# Patient Record
Sex: FEMALE | Race: WHITE | NOT HISPANIC OR LATINO | Employment: FULL TIME | URBAN - METROPOLITAN AREA
[De-identification: names, ages, dates, MRNs, and addresses within clinical notes are randomized per-mention and may not be internally consistent; named-entity substitution may affect disease eponyms.]

---

## 2019-03-18 ENCOUNTER — HOSPITAL ENCOUNTER (OUTPATIENT)
Dept: OTHER | Facility: HOSPITAL | Age: 54
Discharge: HOME | End: 2019-03-18

## 2020-10-01 ENCOUNTER — HOSPITAL ENCOUNTER (OUTPATIENT)
Dept: OTHER | Facility: HOSPITAL | Age: 55
Discharge: HOME | End: 2020-10-01

## 2021-04-27 ENCOUNTER — OFFICE VISIT CONVERTED (OUTPATIENT)
Dept: FAMILY MEDICINE CLINIC | Age: 56
End: 2021-04-27
Attending: FAMILY MEDICINE

## 2021-05-18 NOTE — PROGRESS NOTES
Ada Kilgore  08/31/1955     Office/Outpatient Visit    Visit Date: Tue, Apr 27, 2021 02:39 pm    Provider: Aric Joya MD (Assistant: Bertha Jasso MA)    Location: [facility]        Electronically signed by Aric Joya MD on  04/27/2021 03:22:20 PM                             Subjective:        CC: Ms. Kilgore is a 65 year old female.  This is her first visit to the clinic.  Recent CHF so she needs to establish care with a primary provider.;         HPI:           PHQ-9 Depression Screening: Completed form scanned and in chart; Total Score 9           In regard to the hypothyroidism, unspecified, she is currently taking Levothyroid, 50 mcg daily.  She cannot remember when a TSH was last checked.  She does not know the results of that test.  She denies any related symptoms.  She reports no symptoms suggestive of adverse medication effect.            Additionally, she presents with history of type 2 diabetes mellitus without complications.  specifically, this is type 2, non-insulin requiring diabetes, complicated by coronary artery disease.  Compliance with treatment has been good; she takes her medication as directed, follows up as directed, and is keeping a glucose diary.  She denies experiencing any diabetes related symptoms.  Current meds include an oral hypoglycemic ( Farxiga ( 10 mg QD ) and Glucophage ).  She reports home blood glucose readings have been high, with average fasting glucoses in the 180 to low 200s mg/dL range.  She does not know results of any recent lab monitoring.  Concurrent health problems include hypertension, hypercholesterolemia, and hypothyroidism.            Dx with hyperlipidemia, unspecified; current treatment includes Crestor.  Compliance with treatment has been good; she takes her medication as directed and follows up as directed.  She denies experiencing any hypercholesterolemia related symptoms.  She does not know results of any recent lab monitoring.   Concurrent health problems include hypertension, diabetes, and hypothyroidism.            Additionally, she presents with history of essential (primary) hypertension.  her current cardiac medication regimen includes a diuretic ( Lasix 40 mg daily, spironolactone 25 mg daily ) and a beta-blocker ( Carvedilol 3.125 mg twice daily ).  Compliance with treatment has been good; she takes her medication as directed and follows up as directed.  She is tolerating the medication well without side effects.  Review of her blood pressure log reveals systolics in the 120s and diastolics in the 70s to 80s.         Pertaining to CHF, Ada reports that she was diagnosed approximately 6 to 8 weeks ago at which time she was hospitalized to [place].  She follows with cardiology regularly.  Today, she denies symptoms.  Specifically, she denies chest pain, shortness of breath, edema or palpitations.  Her current regimen includes digoxin, Lasix, spironolactone and carvedilol.  She is not sure what type of congestive heart failure she has.  She says that during her hospitalization she had an echocardiogram and a cardiac catheterization done.  To her knowledge, the cardiac cath was clean of CAD.    ROS:     CONSTITUTIONAL:  Negative for chills, fatigue, fever, and weight change.      EYES:  Negative for blurred vision.      E/N/T:  Negative for ear pain and tinnitus.      CARDIOVASCULAR:  Negative for chest pain, dizziness, palpitations and edema.      RESPIRATORY:  Negative for dyspnea and cough.      GASTROINTESTINAL:  Negative for abdominal pain, diarrhea, heartburn, nausea and vomiting.      GENITOURINARY:  Negative for dysuria, hematuria and polyuria.      MUSCULOSKELETAL:  Negative for arthralgias and myalgias.      INTEGUMENTARY/BREAST:  Negative for rash, breast mass and skin changes of breast.      NEUROLOGICAL:  Negative for headaches, paresthesias and weakness.      ENDOCRINE:  Negative for hair loss, heat/cold intolerance,  polydipsia, and polyphagia.      PSYCHIATRIC:  Negative for anxiety, depression, and sleep disturbances.          Past Medical History / Family History / Social History:         Last Reviewed on 2021 03:22 PM by Aric Joya    Past Medical History:             PAST MEDICAL HISTORY         Positive for    Congestive Heart Failure,    Hyperlipidemia and    Hypertension;     Positive for    Type 2 Diabetes and    Hypothyroidism;         PREVENTIVE HEALTH MAINTENANCE             COLORECTAL CANCER SCREENING: declines colorectal cancer screening, understands reason for testing     PAP SMEAR: with normal results         Surgical History:         Positive for    : X 2; and    Tonsillectomy/Adenoidectomy;         Family History:         Positive for Congestive Heart Failure, Coronary Artery Disease, Hyperlipidemia, Hypertension and Myocardial Infarction;     Positive for Type 2 Diabetes;         Social History:     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          Substance Abuse History:     Last Reviewed on 2021 03:22 PM by Aric Joya        Mental Health History:     Last Reviewed on 2021 03:22 PM by Aric Joya        Communicable Diseases (eg STDs):     Last Reviewed on 2021 03:22 PM by Aric Joya        Current Problems:     Last Reviewed on 2021 03:22 PM by Aric Joya    Hypothyroidism, unspecified    Type 2 diabetes mellitus without complications    Hyperlipidemia, unspecified    Essential (primary) hypertension    Heart failure, unspecified    Encounter for screening for depression        Immunizations:     None        Current Medications:     Last Reviewed on 2021 03:22 PM by Aric Joya    iron 325 mg (65 mg iron) oral tablet [take 1 tablet (65 mg) by oral  "route 2 times per day]    zinc 50 mg oral tablet    flaxseed  [1000mg]    Vitamin C  [1000mg]    CARVEDILOL   TAB 3.125MG    DIGOXIN      TAB 0.125MG    FARXIGA      TAB 10MG    FUROSEMIDE   TAB 40MG    ROSUVASTATIN TAB 10MG    SPIRONOLACT  TAB 25MG    METFORMIN    TAB 500MG ER    LEVOTHYROXIN TAB 50MCG        Objective:        Vitals:         Current: 4/27/2021 2:50:02 PM    Ht:  5 ft, 1 in;  Wt: 183.2 lbs;  BMI: 34.6T: 96.3 F (temporal);  BP: 129/74 mm Hg (right arm, sitting);  P: 94 bpm (right arm (BP Cuff), sitting)        Exams:     PHYSICAL EXAM:     GENERAL: vital signs recorded - well developed, well nourished;  no apparent distress;     EYES: conjunctiva and cornea are normal;     RESPIRATORY: no rales (\"crackles\") present; no rhonchi; no wheezes; Clear to auscultation bilaterally;     CARDIOVASCULAR: normal rate; rhythm is regular;  a systolic murmur is noted: it is grade 2/6;  no edema;     GASTROINTESTINAL: nontender; Soft and nondistended; normal bowel sounds; no organomegaly; no masses;     LYMPHATIC: no enlargement of cervical or facial nodes; no supraclavicular nodes;     BREAST/INTEGUMENT: No significant rashes, lesions or suspicious moles within limits of examination;     NEUROLOGIC: Grossly intact; mental status: alert and oriented x 3;     PSYCHIATRIC: appropriate affect and demeanor; normal speech pattern; Normal behavior;         Assessment:         E03.9   Hypothyroidism, unspecified       E11.9   Type 2 diabetes mellitus without complications       E78.5   Hyperlipidemia, unspecified       I10   Essential (primary) hypertension       I50.9   Heart failure, unspecified       Z13.31   Encounter for screening for depression           ORDERS:         Other Orders:         Depression screen negative  (In-House)                      Plan:         Hypothyroidism, unspecified- Unknown control.  Continue Synthroid 50 mcg daily.  Will request records from previous provider.  Will recheck TSH at " next appointment.        Type 2 diabetes mellitus without complications- Unknown control.  Continue Metformin and Farxiga.  Will request records from previous provider.  Will repeat A1c at next visit.        Hyperlipidemia, unspecifiedUnknown control.  Continue Crestor 10 mg daily.  Will request records from previous provider.  Will repeat check lipid panel at next appointment.        Essential (primary) hypertensionStable.  Continue Lasix 40 mg daily, spironolactone 25 mg daily and carvedilol 3.125 mg daily.        Heart failure, unspecifiedStable.  Continue to follow with cardiology as directed.  Continue current regimen. Records from cardiologist as well as hospitalization.        Encounter for screening for depression    MIPS PHQ-9 Depression Screening: Completed form scanned and in chart; Total Score 9; Positive Depression Screen but after further evaluation the patient does not have a diagnosis of depression.            Orders:         Depression screen negative  (In-House)                  Charge Capture:         Primary Diagnosis:     E03.9  Hypothyroidism, unspecified           Orders:      05297  Office visit - new pt, level 3  (In-House)              E11.9  Type 2 diabetes mellitus without complications     E78.5  Hyperlipidemia, unspecified     I10  Essential (primary) hypertension     I50.9  Heart failure, unspecified     Z13.31  Encounter for screening for depression           Orders:        Depression screen negative  (In-House)

## 2021-07-02 VITALS
SYSTOLIC BLOOD PRESSURE: 129 MMHG | DIASTOLIC BLOOD PRESSURE: 74 MMHG | HEIGHT: 61 IN | HEART RATE: 94 BPM | TEMPERATURE: 96.3 F | BODY MASS INDEX: 34.59 KG/M2 | WEIGHT: 183.2 LBS

## 2021-07-29 ENCOUNTER — OFFICE VISIT (OUTPATIENT)
Dept: FAMILY MEDICINE CLINIC | Age: 56
End: 2021-07-29

## 2021-07-29 VITALS
BODY MASS INDEX: 34.02 KG/M2 | DIASTOLIC BLOOD PRESSURE: 57 MMHG | WEIGHT: 180.2 LBS | SYSTOLIC BLOOD PRESSURE: 112 MMHG | HEIGHT: 61 IN | HEART RATE: 73 BPM

## 2021-07-29 DIAGNOSIS — I50.22 CHRONIC SYSTOLIC CONGESTIVE HEART FAILURE (HCC): ICD-10-CM

## 2021-07-29 DIAGNOSIS — I10 ESSENTIAL HYPERTENSION: Primary | ICD-10-CM

## 2021-07-29 DIAGNOSIS — E11.9 TYPE 2 DIABETES MELLITUS WITHOUT COMPLICATION, WITHOUT LONG-TERM CURRENT USE OF INSULIN (HCC): ICD-10-CM

## 2021-07-29 DIAGNOSIS — E03.9 HYPOTHYROIDISM, UNSPECIFIED TYPE: ICD-10-CM

## 2021-07-29 DIAGNOSIS — E78.5 HYPERLIPIDEMIA, UNSPECIFIED HYPERLIPIDEMIA TYPE: ICD-10-CM

## 2021-07-29 PROBLEM — I50.9 CHF (CONGESTIVE HEART FAILURE) (HCC): Status: ACTIVE | Noted: 2021-07-29

## 2021-07-29 PROCEDURE — 99214 OFFICE O/P EST MOD 30 MIN: CPT | Performed by: FAMILY MEDICINE

## 2021-07-29 RX ORDER — CARVEDILOL 25 MG/1
25 TABLET ORAL 2 TIMES DAILY
COMMUNITY
Start: 2021-06-28

## 2021-07-29 RX ORDER — LOSARTAN POTASSIUM 25 MG/1
12.5 TABLET ORAL DAILY
COMMUNITY
Start: 2021-05-13

## 2021-07-29 RX ORDER — FUROSEMIDE 40 MG/1
40 TABLET ORAL DAILY
COMMUNITY
Start: 2021-04-12

## 2021-07-29 RX ORDER — METFORMIN HYDROCHLORIDE 500 MG/1
1 TABLET, EXTENDED RELEASE ORAL 2 TIMES DAILY
COMMUNITY
Start: 2021-06-10

## 2021-07-29 RX ORDER — ALBUTEROL SULFATE 2.5 MG/3ML
1 SOLUTION RESPIRATORY (INHALATION)
COMMUNITY
Start: 2021-03-06

## 2021-07-29 RX ORDER — DIGOXIN 125 MCG
1 TABLET ORAL DAILY
COMMUNITY
Start: 2021-05-12

## 2021-07-29 RX ORDER — LEVOTHYROXINE SODIUM 0.05 MG/1
50 TABLET ORAL DAILY
Qty: 90 TABLET | Refills: 1 | Status: SHIPPED | OUTPATIENT
Start: 2021-07-29

## 2021-07-29 RX ORDER — LEVOTHYROXINE SODIUM 0.05 MG/1
50 TABLET ORAL DAILY
COMMUNITY
End: 2021-07-29 | Stop reason: SDUPTHER

## 2021-07-29 RX ORDER — ASPIRIN 81 MG/1
81 TABLET ORAL DAILY
COMMUNITY
Start: 2021-03-16

## 2021-07-29 RX ORDER — SPIRONOLACTONE 25 MG/1
0.5 TABLET ORAL DAILY
COMMUNITY
Start: 2021-07-09

## 2021-07-29 RX ORDER — ROSUVASTATIN CALCIUM 10 MG/1
TABLET, COATED ORAL
COMMUNITY
Start: 2021-07-09

## 2021-07-29 RX ORDER — GLIMEPIRIDE 2 MG/1
1 TABLET ORAL DAILY
COMMUNITY
Start: 2021-03-25

## 2021-07-29 NOTE — PROGRESS NOTES
[facility]  Office Visit Note    Ada Kilgore presents to [facility]  Encounter Date: 07/29/2021     Chief Complaint  Hypothyroidism (Follow up), Diabetes (Follow up), and Hypertension (Follow up)    Subjective    History of Present Illness:      1.  Hypertension: Pertaining to hypertension, Ada reports that her blood pressures have been well controlled on her current regimen of Lasix 40 mg daily, spironolactone 25 mg daily, losartan 12.5 mg daily and carvedilol 25 mg twice daily.  2.  DM2: Pertaining to type 2 diabetes, Ada reports that she does not check her blood sugar very regularly.  Her current regimen includes Metformin 500 mg twice daily, Farxiga 10 mg daily.  Her most recent A1c was 7.8%.  3.  Hypothyroidism: Pertaining to hypothyroidism, she can then takes Synthroid 50 mcg daily.  In regard to the hypothyroidism, unspecified, she is currently taking Levothyroid, 50 mcg daily. She cannot remember when a TSH was last checked. She denies any related symptoms.    4.  Hyperlipidemia: Pertaining hyperlipidemia, Ada takes Crestor 10 mg daily. Her most recent lipid panel showed a total cholesterol of 176 and an LDL of 79 on 3/15/2021.  5.  CHF: Pertaining to heart failure, this is a relatively new diagnosis for Ada within the last year.  She has been diagnosed with nonischemic cardiomyopathy with systolic heart failure.  Cardiac catheterization was clean.  She follows with cardiology regularly.  Today, she denies symptoms.  Her current regimen includes digoxin 125 mcg daily, Lasix 40 mg daily, spironolactone 25 mg daily and carvedilol 25 mg twice daily.  She has a new echocardiogram upcoming.          Review of Systems:  Review of Systems   Constitutional: Negative for chills, fatigue, fever and unexpected weight loss.   Eyes: Negative for blurred vision.   Respiratory: Negative for cough and shortness of breath.    Cardiovascular: Negative for  "chest pain, palpitations and leg swelling.   Gastrointestinal: Negative for abdominal pain, constipation, diarrhea, nausea and vomiting.   Endocrine: Negative for cold intolerance, heat intolerance, polydipsia, polyphagia and polyuria.   Neurological: Negative for dizziness, weakness, numbness and headache.   Psychiatric/Behavioral: Negative for sleep disturbance, suicidal ideas and depressed mood. The patient is not nervous/anxious.         Objective   Vital Signs:  /57 (BP Location: Right arm, Patient Position: Sitting, Cuff Size: Adult)   Pulse 73   Ht 154.9 cm (61\")   Wt 81.7 kg (180 lb 3.2 oz)   BMI 34.05 kg/m²      Physical Examination:  Physical Exam  Vitals reviewed.   Constitutional:       General: She is not in acute distress.     Appearance: Normal appearance.   HENT:      Head: Normocephalic and atraumatic.   Eyes:      Conjunctiva/sclera: Conjunctivae normal.   Cardiovascular:      Rate and Rhythm: Normal rate and regular rhythm.      Heart sounds: No murmur heard.     Pulmonary:      Effort: Pulmonary effort is normal. No respiratory distress.      Breath sounds: Normal breath sounds.   Musculoskeletal:      Right lower leg: No edema.      Left lower leg: No edema.   Skin:     General: Skin is warm and dry.      Findings: No rash.   Neurological:      General: No focal deficit present.      Mental Status: She is alert and oriented to person, place, and time.   Psychiatric:         Mood and Affect: Mood normal.         Behavior: Behavior normal.         Result Review   The following data was reviewed by: Aric Joya MD on 07/29/2021:  LIPID PANEL (03/15/2021 03:32)  HEMOGLOBIN A1C (03/12/2021 04:22)  BASIC METABOLIC PANEL (06/25/2021 14:15)  HEPATIC FUNCTION PANEL (03/13/2021 05:53)    Procedures:    No procedures associated with this visit.       Assessment and Plan:  Diagnoses and all orders for this visit:    1. Essential hypertension (Primary)  Assessment & Plan:  Stable.  Continue Lasix " 40 mg daily, spironolactone 25 mg daily, losartan 12.5 mg daily and carvedilol 25 mg twice daily.    Orders:  -     CBC w AUTO Differential; Future  -     Comprehensive metabolic panel; Future  -     Lipid panel; Future  -     TSH; Future    2. Type 2 diabetes mellitus without complication, without long-term current use of insulin (CMS/[facility])  Assessment & Plan:  Not at goal.  Continue Metformin 1000 mg twice daily and Farxiga 10 mg daily.  A1c ordered today.    Orders:  -     Hemoglobin A1c; Future    3. Hypothyroidism, unspecified type  Assessment & Plan:  Stable.  Continue Synthroid 50 mcg daily.      4. Hyperlipidemia, unspecified hyperlipidemia type  Assessment & Plan:  Stable.  Continue Crestor 10 mg daily.      5. Chronic systolic congestive heart failure (CMS/[facility])  Assessment & Plan:  Stable.  Continue to follow cardiology as directed.  Continue digoxin 125 microbes daily, Lasix 40 mg daily, spironolactone 25 mg daily and carvedilol 25 mg twice daily.      Other orders  -     levothyroxine (SYNTHROID, LEVOTHROID) 50 MCG tablet; Take 1 tablet by mouth Daily.  Dispense: 90 tablet; Refill: 1      Return in about 3 months (around 10/29/2021).    Patient was given instructions and counseling regarding her condition or for health maintenance advice. Please see specific information pulled into the AVS if appropriate.      Aric Joya MD   7/29/2021

## 2021-07-29 NOTE — ASSESSMENT & PLAN NOTE
Stable.  Continue Lasix 40 mg daily, spironolactone 25 mg daily, losartan 12.5 mg daily and carvedilol 25 mg twice daily.

## 2021-07-29 NOTE — ASSESSMENT & PLAN NOTE
Stable.  Continue to follow cardiology as directed.  Continue digoxin 125 microbes daily, Lasix 40 mg daily, spironolactone 25 mg daily and carvedilol 25 mg twice daily.

## 2021-08-05 ENCOUNTER — LAB (OUTPATIENT)
Dept: LAB | Facility: HOSPITAL | Age: 56
End: 2021-08-05

## 2021-08-05 DIAGNOSIS — I10 ESSENTIAL HYPERTENSION: ICD-10-CM

## 2021-08-05 DIAGNOSIS — E11.9 TYPE 2 DIABETES MELLITUS WITHOUT COMPLICATION, WITHOUT LONG-TERM CURRENT USE OF INSULIN (HCC): ICD-10-CM

## 2021-08-05 LAB
BASOPHILS # BLD AUTO: 0.03 10*3/MM3 (ref 0–0.2)
BASOPHILS NFR BLD AUTO: 0.4 % (ref 0–1.5)
DEPRECATED RDW RBC AUTO: 47.6 FL (ref 37–54)
EOSINOPHIL # BLD AUTO: 0.22 10*3/MM3 (ref 0–0.4)
EOSINOPHIL NFR BLD AUTO: 2.6 % (ref 0.3–6.2)
ERYTHROCYTE [DISTWIDTH] IN BLOOD BY AUTOMATED COUNT: 14.2 % (ref 12.3–15.4)
HBA1C MFR BLD: 9.05 % (ref 4.8–5.6)
HCT VFR BLD AUTO: 41.5 % (ref 34–46.6)
HGB BLD-MCNC: 13.4 G/DL (ref 12–15.9)
HOLD SPECIMEN: NORMAL
IMM GRANULOCYTES # BLD AUTO: 0.03 10*3/MM3 (ref 0–0.05)
IMM GRANULOCYTES NFR BLD AUTO: 0.4 % (ref 0–0.5)
LYMPHOCYTES # BLD AUTO: 2.5 10*3/MM3 (ref 0.7–3.1)
LYMPHOCYTES NFR BLD AUTO: 29.9 % (ref 19.6–45.3)
MCH RBC QN AUTO: 29.3 PG (ref 26.6–33)
MCHC RBC AUTO-ENTMCNC: 32.3 G/DL (ref 31.5–35.7)
MCV RBC AUTO: 90.6 FL (ref 79–97)
MONOCYTES # BLD AUTO: 0.52 10*3/MM3 (ref 0.1–0.9)
MONOCYTES NFR BLD AUTO: 6.2 % (ref 5–12)
NEUTROPHILS NFR BLD AUTO: 5.07 10*3/MM3 (ref 1.7–7)
NEUTROPHILS NFR BLD AUTO: 60.5 % (ref 42.7–76)
PLATELET # BLD AUTO: 137 10*3/MM3 (ref 140–450)
PMV BLD AUTO: 11.8 FL (ref 6–12)
RBC # BLD AUTO: 4.58 10*6/MM3 (ref 3.77–5.28)
TSH SERPL DL<=0.05 MIU/L-ACNC: 3.6 UIU/ML (ref 0.27–4.2)
WBC # BLD AUTO: 8.37 10*3/MM3 (ref 3.4–10.8)

## 2021-08-05 PROCEDURE — 80053 COMPREHEN METABOLIC PANEL: CPT

## 2021-08-05 PROCEDURE — 84443 ASSAY THYROID STIM HORMONE: CPT

## 2021-08-05 PROCEDURE — 36415 COLL VENOUS BLD VENIPUNCTURE: CPT

## 2021-08-05 PROCEDURE — 80061 LIPID PANEL: CPT

## 2021-08-05 PROCEDURE — 83036 HEMOGLOBIN GLYCOSYLATED A1C: CPT

## 2021-08-05 PROCEDURE — 85025 COMPLETE CBC W/AUTO DIFF WBC: CPT

## 2021-08-06 DIAGNOSIS — D69.59 THROMBOCYTOPENIA DUE TO COVID-19 VIRUS: Primary | ICD-10-CM

## 2021-08-06 DIAGNOSIS — U07.1 THROMBOCYTOPENIA DUE TO COVID-19 VIRUS: Primary | ICD-10-CM

## 2021-08-06 LAB
ALBUMIN SERPL-MCNC: 4 G/DL (ref 3.5–5.2)
ALBUMIN/GLOB SERPL: 1.4 G/DL
ALP SERPL-CCNC: 71 U/L (ref 39–117)
ALT SERPL W P-5'-P-CCNC: 22 U/L (ref 1–33)
ANION GAP SERPL CALCULATED.3IONS-SCNC: 9.2 MMOL/L (ref 5–15)
AST SERPL-CCNC: 20 U/L (ref 1–32)
BILIRUB SERPL-MCNC: 0.6 MG/DL (ref 0–1.2)
BUN SERPL-MCNC: 11 MG/DL (ref 6–20)
BUN/CREAT SERPL: 20.4 (ref 7–25)
CALCIUM SPEC-SCNC: 9.3 MG/DL (ref 8.6–10.5)
CHLORIDE SERPL-SCNC: 97 MMOL/L (ref 98–107)
CHOLEST SERPL-MCNC: 113 MG/DL (ref 0–200)
CO2 SERPL-SCNC: 30.8 MMOL/L (ref 22–29)
CREAT SERPL-MCNC: 0.54 MG/DL (ref 0.57–1)
GFR SERPL CREATININE-BSD FRML MDRD: 117 ML/MIN/1.73
GFR SERPL CREATININE-BSD FRML MDRD: 142 ML/MIN/1.73
GLOBULIN UR ELPH-MCNC: 2.8 GM/DL
GLUCOSE SERPL-MCNC: 193 MG/DL (ref 65–99)
HDLC SERPL-MCNC: 25 MG/DL (ref 40–60)
LDLC SERPL CALC-MCNC: 49 MG/DL (ref 0–100)
LDLC/HDLC SERPL: 1.57 {RATIO}
POTASSIUM SERPL-SCNC: 4.3 MMOL/L (ref 3.5–5.2)
PROT SERPL-MCNC: 6.8 G/DL (ref 6–8.5)
SODIUM SERPL-SCNC: 137 MMOL/L (ref 136–145)
TRIGL SERPL-MCNC: 244 MG/DL (ref 0–150)
VLDLC SERPL-MCNC: 39 MG/DL (ref 5–40)

## 2021-08-17 ENCOUNTER — LAB (OUTPATIENT)
Dept: LAB | Facility: HOSPITAL | Age: 56
End: 2021-08-17

## 2021-08-17 DIAGNOSIS — D69.59 THROMBOCYTOPENIA DUE TO COVID-19 VIRUS: ICD-10-CM

## 2021-08-17 DIAGNOSIS — U07.1 THROMBOCYTOPENIA DUE TO COVID-19 VIRUS: ICD-10-CM

## 2021-08-17 LAB
BASOPHILS # BLD AUTO: 0.09 10*3/MM3 (ref 0–0.2)
BASOPHILS NFR BLD AUTO: 0.9 % (ref 0–1.5)
DEPRECATED RDW RBC AUTO: 44.7 FL (ref 37–54)
EOSINOPHIL # BLD AUTO: 0.27 10*3/MM3 (ref 0–0.4)
EOSINOPHIL NFR BLD AUTO: 2.6 % (ref 0.3–6.2)
ERYTHROCYTE [DISTWIDTH] IN BLOOD BY AUTOMATED COUNT: 13.6 % (ref 12.3–15.4)
HCT VFR BLD AUTO: 43.3 % (ref 34–46.6)
HGB BLD-MCNC: 13.7 G/DL (ref 12–15.9)
IMM GRANULOCYTES # BLD AUTO: 0.03 10*3/MM3 (ref 0–0.05)
IMM GRANULOCYTES NFR BLD AUTO: 0.3 % (ref 0–0.5)
LYMPHOCYTES # BLD AUTO: 3.07 10*3/MM3 (ref 0.7–3.1)
LYMPHOCYTES NFR BLD AUTO: 29.5 % (ref 19.6–45.3)
MCH RBC QN AUTO: 28.5 PG (ref 26.6–33)
MCHC RBC AUTO-ENTMCNC: 31.6 G/DL (ref 31.5–35.7)
MCV RBC AUTO: 90.2 FL (ref 79–97)
MONOCYTES # BLD AUTO: 0.63 10*3/MM3 (ref 0.1–0.9)
MONOCYTES NFR BLD AUTO: 6 % (ref 5–12)
NEUTROPHILS NFR BLD AUTO: 6.33 10*3/MM3 (ref 1.7–7)
NEUTROPHILS NFR BLD AUTO: 60.7 % (ref 42.7–76)
NRBC BLD AUTO-RTO: 0 /100 WBC (ref 0–0.2)
PLATELET # BLD AUTO: 159 10*3/MM3 (ref 140–450)
PMV BLD AUTO: 12.7 FL (ref 6–12)
RBC # BLD AUTO: 4.8 10*6/MM3 (ref 3.77–5.28)
WBC # BLD AUTO: 10.42 10*3/MM3 (ref 3.4–10.8)

## 2021-08-17 PROCEDURE — 85025 COMPLETE CBC W/AUTO DIFF WBC: CPT

## 2021-08-17 PROCEDURE — 36415 COLL VENOUS BLD VENIPUNCTURE: CPT

## 2022-06-03 ENCOUNTER — PATIENT OUTREACH (OUTPATIENT)
Dept: CASE MANAGEMENT | Facility: OTHER | Age: 57
End: 2022-06-03

## 2022-06-03 NOTE — OUTREACH NOTE
AMBULATORY CASE MANAGEMENT NOTE    Name and Relationship of Patient/Support Person: Ada Kilgore - Self        Education Documentation  Importance of Glycemic Management, taught by Adonay Laird, RN at 6/3/2022  2:39 PM.  Learner: Patient  Readiness: Acceptance  Method: Handout, Explanation  Response: Verbalizes Understanding  Comment: Blanche discussed with patient and agrees to CM sending info to her work email.    Blood Glucose Monitoring, taught by Adonay Laird, RN at 6/3/2022  2:39 PM.  Learner: Patient  Readiness: Acceptance  Method: Handout, Explanation  Response: Verbalizes Understanding  Comment: Blanche discussed with patient and agrees to CM sending info to her work email.        Adult Patient Profile  Questions/Answers    Flowsheet Row Most Recent Value   Symptoms/Conditions Managed at Home cardiovascular, diabetes, type 2   Barriers to Managing Health none   Cardiovascular Symptoms/Conditions hypertension, heart failure   Cardiovascular Management Strategies diet modification, exercise, fluid modification, medication therapy, weight management   Cardiovascular Self-Management Outcome 4 (good)   Diabetes Management Strategies blood glucose testing, diet modification, exercise, insulin therapy, medication therapy, weight management, routine screenings   Frequency of Blood Glucose Testing before meals and at bedtime   A1C Target below 7   Last A1C Result 9   Diabetes Self-Management Outcome 3 (uncertain)   How to be Addressed Ada   How Well Do You Speak English? very well   Source of Information patient        Adult Wellness Assessment  Questions/Answers    Flowsheet Row Most Recent Value   Help with Reading Health-Related Information never   Problems Learning about Medical Condition never   Confidence in Filling Out Forms by Self always        Send Education  Questions/Answers    Flowsheet Row Most Recent Value   Annual Wellness Visit:  Patient Has Completed   Other Patient  Education/Resources  [facility] Nurse Call Line, [facility]   24/7 Nurse Call Line Education Method Send Materials   [facility] Education Method Send Materials        Patient Outreach    Spoke with patient for engagement call. Reviewed [employer] benefit and retail pharmacy. Pt works at [employer] from 930-6 and retail is closed by the time she gets off. Discussed mail order option. CM will verify and get back with patient (see CC note). Pt is able to go when pharmacy opens at 8, however, this is quite a drive from her home to pharmacy and then to work. [facility] is not open on Saturday.  [employer] is doing some mail order - but not sure if to employees, told to contact rep at [employer] to verify.   email to pt's work email with info on [employer] sign up and CM program, [employer] and health benefits.    No issue with social determinants,denies food, medication, transportation, or financial insecurities. No questions or concerns per pt at this time. Advised pt to call RN-ECM with any new needs. Agrees to follow up outreach via phone, email or text.          Care Coordination  email to pt:   CM speak with Tammi at [facility] to verify mail order and they informed me that at this time, they do not have the capability - will likely be in 2-3 months. Same with [facility]. The rep mentioned if you can get the 90 day supply, then hopefully by your next fill , they should be able to accommodate the mail order.  They are still working on the refrigerated medication process.   She said when you are about 2 weeks out before you need a refill, to give them a call to see where they are at with the mail order. Another option is, if you have a family member that can  your medication? Not sure if that would help, but it is another alternative until mail order is ready.   [facility] retail pharmacy is open on Saturday .   I also wanted to share this link as it has a lot of helpful resources and guides.      Health Plans ([employer])      DEBBIE GREEN  Ambulatory Case Management    6/3/2022, 14:41 EDT

## 2022-06-20 ENCOUNTER — PATIENT OUTREACH (OUTPATIENT)
Dept: CASE MANAGEMENT | Facility: OTHER | Age: 57
End: 2022-06-20

## 2022-06-20 NOTE — OUTREACH NOTE
AMBULATORY CASE MANAGEMENT NOTE    Name and Relationship of Patient/Support Person: Ada Kilgore - Self    Patient Outreach    Brief contact with patient via email to ensure she coordinated services with Blanche. Care plan updated.      Next outreach scheduled, no other questions or concerns.        DEBBIE GREEN  Ambulatory Case Management    6/20/2022, 15:48 EDT

## 2022-08-02 ENCOUNTER — PATIENT OUTREACH (OUTPATIENT)
Dept: CASE MANAGEMENT | Facility: OTHER | Age: 57
End: 2022-08-02

## 2022-08-02 NOTE — OUTREACH NOTE
AMBULATORY CASE MANAGEMENT NOTE    Name and Relationship of Patient/Support Person: Ada Kilgore - Self  Self    Patient Outreach    Spoke with patient via email for follow up. Care plan updated. No issue with social determinants,denies food, medication, transportation, or financial insecurities. No questions or concerns per pt at this time. Advised pt to call RN-ECM with any new needs. Agrees to follow up outreach.        DEBBIE GREEN  Ambulatory Case Management    8/2/2022, 10:59 EDT

## 2023-01-03 ENCOUNTER — HOSPITAL ENCOUNTER (OUTPATIENT)
Dept: OTHER | Facility: HOSPITAL | Age: 58
Discharge: HOME | End: 2023-01-03

## 2023-01-30 ENCOUNTER — PATIENT OUTREACH (OUTPATIENT)
Dept: CASE MANAGEMENT | Facility: OTHER | Age: 58
End: 2023-01-30
Payer: COMMERCIAL

## 2023-01-30 NOTE — OUTREACH NOTE
AMBULATORY CASE MANAGEMENT NOTE    Name and Relationship of Patient/Support Person: Ada Kilgore - Self        DEBBIE NORMA  Ambulatory Case Management    1/30/2023, 16:26 EST     follow-up with pt via email. Pt reports doing well. Care plan updated. No concerns or questions. Compliant with medications.  Pt has follow up appointments scheduled.  No issue with social determinants,denies food, medication, transportation, or financial insecurities. No questions or concerns per pt at this time. Advised pt to call RN-ECM with any new needs. Encouraged use of \[facility] [place] [facility]\""our nurseline if needed. Continue to monitor. Agrees to outreach.

## 2024-04-22 ENCOUNTER — HOSPITAL ENCOUNTER (OUTPATIENT)
Dept: OTHER | Facility: HOSPITAL | Age: 59
Discharge: HOME | End: 2024-04-22

## 2024-06-28 ENCOUNTER — TRANSCRIBE ORDERS (OUTPATIENT)
Dept: ADMINISTRATIVE | Facility: HOSPITAL | Age: 59
End: 2024-06-28
Payer: COMMERCIAL

## 2024-06-28 DIAGNOSIS — R92.8 ABNORMAL MAMMOGRAM: Primary | ICD-10-CM

## 2024-07-05 ENCOUNTER — HOSPITAL ENCOUNTER (OUTPATIENT)
Dept: ULTRASOUND IMAGING | Facility: HOSPITAL | Age: 59
Discharge: HOME | End: 2024-07-05
Payer: COMMERCIAL

## 2024-07-05 ENCOUNTER — HOSPITAL ENCOUNTER (OUTPATIENT)
Dept: MAMMOGRAPHY | Facility: HOSPITAL | Age: 59
Discharge: HOME | End: 2024-07-05
Payer: COMMERCIAL

## 2024-07-05 DIAGNOSIS — R92.8 ABNORMAL MAMMOGRAM: ICD-10-CM

## 2024-07-05 PROCEDURE — G0279 TOMOSYNTHESIS, MAMMO: HCPCS

## 2024-07-05 PROCEDURE — 77065 DX MAMMO INCL CAD UNI: CPT

## 2024-07-05 PROCEDURE — 76642 ULTRASOUND BREAST LIMITED: CPT

## 2025-07-10 ENCOUNTER — OFFICE VISIT (OUTPATIENT)
Dept: ENDOCRINOLOGY | Age: 60
End: 2025-07-10
Payer: COMMERCIAL

## 2025-07-10 VITALS
WEIGHT: 175.4 LBS | OXYGEN SATURATION: 98 % | SYSTOLIC BLOOD PRESSURE: 124 MMHG | TEMPERATURE: 97.5 F | DIASTOLIC BLOOD PRESSURE: 76 MMHG | HEIGHT: 61 IN | HEART RATE: 96 BPM | BODY MASS INDEX: 33.12 KG/M2

## 2025-07-10 DIAGNOSIS — Z79.4 TYPE 2 DIABETES MELLITUS WITH HYPERGLYCEMIA, WITH LONG-TERM CURRENT USE OF INSULIN: Primary | ICD-10-CM

## 2025-07-10 DIAGNOSIS — I10 ESSENTIAL HYPERTENSION: ICD-10-CM

## 2025-07-10 DIAGNOSIS — E78.5 HYPERLIPIDEMIA, UNSPECIFIED HYPERLIPIDEMIA TYPE: ICD-10-CM

## 2025-07-10 DIAGNOSIS — E11.65 TYPE 2 DIABETES MELLITUS WITH HYPERGLYCEMIA, WITH LONG-TERM CURRENT USE OF INSULIN: Primary | ICD-10-CM

## 2025-07-10 RX ORDER — TIRZEPATIDE 2.5 MG/.5ML
2.5 INJECTION, SOLUTION SUBCUTANEOUS WEEKLY
Qty: 2 ML | Refills: 0 | Status: SHIPPED | OUTPATIENT
Start: 2025-07-10

## 2025-07-10 RX ORDER — TIRZEPATIDE 5 MG/.5ML
5 INJECTION, SOLUTION SUBCUTANEOUS WEEKLY
Qty: 6 ML | Refills: 1 | Status: SHIPPED | OUTPATIENT
Start: 2025-07-10

## 2025-07-10 NOTE — PROGRESS NOTES
Chief Complaint  Chief Complaint   Patient presents with    Diabetes     Type 2 : Pt doesn't have meter today, checks bs once a day, is up to date on eye exam, no hx of retinopathy or neuropathy.         Subjective        History of Present Illness    Ada Kilgore 59 y.o. presents for an evaluation for Type 2 Diabetes    Referring provider: NATY Humphreys    She was diagnosed with diabetes in 2005 and started insulin in 01/2025    Family history of diabetes: mother and father both with type 2 diabetes    Pt is on the following medications for their diabetes: metformin  mg 1 tablet twice daily, Ozempic 2 mg weekly, insulin glargine 30 units daily, glimepiride 2 mg daily and Farxiga 10 mg daily      Past medications include Januvia switched to Ozempic  Trulicity    Pt has CHF therefore will avoid pioglitazone      Personal history of pancreatitis: denies  Personal/family history of thyroid cancer: denies    History of DKA: denies      Pt complains of polyuria, intermittent nausea/vomiting (Ozempic is taken Sunday and then on Tuesday/Wednesday will start with nausea/vomiting)     Denies polydipsia, polyuria, diarrhea, constipation, chest pain, shortness of breath, vision changes or numbness and tingling in feet/hands.    Pt denies a history of diabetic retinopathy.  Last eye exam was 09/2024    Pt does have nephropathy.  Patient is currently taking ARB    Pt denies have neuropathy.    Pt denies have a history of CAD or CVA.    Last A1C in 05/25 was 7.5    Last microalbumin in 04/24 was positive          Blood Sugars    Blood glucoses are checked 1/day.    Fasting blood glucoses:  - mostly around 130s    Pt denies episodes of hypoglycemia.            Hyperlipidemia     Pt is currently taking rosuvastatin 20 mg HS    Last lipid panel in 02/25 showed Total 103, HDL 28, LDL 33 and Triglycerides 274            Hypertension    Pt denies any chest pain, palpitations, shortness of breath or  "headache    Current regimen includes carvedilol 25 mg twice daily, sacubitril-valsartain 24-26 mg twice daily and spironolactone 12.5 mg daily    [facility]          Objective   Vital Signs:   /76   Pulse 96   Temp 97.5 °F (36.4 °C) (Oral)   Ht 154.9 cm (61\")   Wt 79.6 kg (175 lb 6.4 oz)   SpO2 98%   BMI 33.14 kg/m²       Physical Exam   Physical Exam  Constitutional:       General: She is not in acute distress.     Appearance: Normal appearance. She is not diaphoretic.   HENT:      Head: Normocephalic and atraumatic.   Eyes:      General:         Right eye: No discharge.         Left eye: No discharge.   Feet:      Right foot:      Toenail Condition: Right toenails are abnormally thick.   Skin:     General: Skin is warm and dry.   Neurological:      Mental Status: She is alert.   Psychiatric:         Mood and Affect: Mood normal.         Behavior: Behavior normal.                    Results Review:   Hemoglobin A1C   Date Value Ref Range Status   08/05/2021 9.05 (H) 4.80 - 5.60 % Final   03/12/2021 7.8 (H) 4.3 - 5.6 % Final     Comment:     (note)  A1C% Reference Range:  4.3 - 5.6  Normal range  5.7 - 6.4  Pre-diabetic -increased risk for developing diabetes mellitus.  >=6.5      Diabetic -diagnostic of diabetes mellitus.  Note: For diagnosis of diabetes in individuals without unequivocal   hyperglycemia, results should be confirmed by repeat testing.    Patients with conditions that shorten erythrocyte survival, such as   recovery from acute blood loss, hemolytic anemia, kidney disease,   or the presence of unstable hemogloblins like HbSS, HbCC, and HbSC   may yield falsely decreased HbA1c test results. Iron deficiency may   yield falsely increased HbA1c test results.     Total Cholesterol   Date Value Ref Range Status   08/05/2021 113 0 - 200 mg/dL Final     Triglycerides   Date Value Ref Range Status   08/05/2021 244 (H) 0 - 150 mg/dL Final     HDL " Cholesterol   Date Value Ref Range Status   08/05/2021 25 (L) 40 - 60 mg/dL Final     LDL Cholesterol    Date Value Ref Range Status   08/05/2021 49 0 - 100 mg/dL Final     VLDL Cholesterol   Date Value Ref Range Status   08/05/2021 39 5 - 40 mg/dL Final     LDL/HDL Ratio   Date Value Ref Range Status   08/05/2021 1.57  Final         Assessment and Plan   Diagnoses and all orders for this visit:    1. Type 2 diabetes mellitus with hyperglycemia, with long-term current use of insulin (Primary)  -     Tirzepatide (Mounjaro) 2.5 MG/0.5ML solution auto-injector; Inject 2.5 mg under the skin into the appropriate area as directed 1 (One) Time Per Week. Inject 2.5 mg once a week for 4 weeks then increase to 5 mg weekly  Dispense: 2 mL; Refill: 0  -     Tirzepatide (Mounjaro) 5 MG/0.5ML solution auto-injector; Inject 5 mg under the skin into the appropriate area as directed 1 (One) Time Per Week.  Dispense: 6 mL; Refill: 1    Pt stated that she was diagnosed with diabetes in 2005 and started insulin in 01/2025  Positive family history of type 2 diabetes in her mother and father   Pt checks her blood sugars once a day in the morning which can range any where from 98 to 159, but mostly around 130s  Check blood sugars daily, varying the time of the day (in the morning when you get up, before lunch/dinner, bedtime or 2 hours after a meal)  Pt's last A1c was 7.5%  Pt's A1c goal would be 7.5% or less without hypoglycemia espeically given that pt is on sulfonylurea and insulin.  Pt has CHF therefore will avoid pioglitazone  Ozempic is taken Sunday and then on Tuesday/Wednesday pt will start with nausea/vomiting.  This has been consistent since increasing Ozempic dose to 2 mg and has not gotten better over time.  Given above we need to either decrease Ozempic back down to 1 mg dose or change to Mounjaro/Trulicity  Pt stated that she tried Trulicity in the past.  Pt would like to change to Mounjaro.  Stop Ozempic   Start Mounjaro  2.5 mg once a week for 4 weeks then increase to 5 mg weekly  Continue with metformin  mg 1 tablet twice daily,   Continue with insulin glargine 30 units daily  Change to taking glimepiride 2 mg daily with breakfast  Continue with Farxiga 10 mg daily      2. Hyperlipidemia, unspecified hyperlipidemia type    Denies any personal history of cardiovascular event(s)   On statin  Last LDL at goal      3. Essential hypertension    Stable  Continue with current medication regimen  Defer management to PCP/Cardiology           RTC in 3 months with me      Follow Up    Patient was given instructions and counseling regarding her condition or for health maintenance advice. Please see specific information pulled into the AVS if appropriate.              Lyly Acosta, NATY  07/10/25

## 2025-07-10 NOTE — PATIENT INSTRUCTIONS
Change Ozempic 2 mg weekly to Mounjaro 2.5 mg once a week for 4 weeks then increase to 5 mg weekly    Continue with metformin  mg 1 tablet twice daily  Continue with insulin glargine 30 units daily  Change to taking glimepiride 2 mg daily with breakfast  Continue with Farxiga 10 mg daily    Check blood sugars daily, varying the time of the day (in the morning when you get up, before lunch/dinner, bedtime or 2 hours after a meal)

## 2025-08-11 DIAGNOSIS — E11.65 TYPE 2 DIABETES MELLITUS WITH HYPERGLYCEMIA, WITH LONG-TERM CURRENT USE OF INSULIN: ICD-10-CM

## 2025-08-11 DIAGNOSIS — Z79.4 TYPE 2 DIABETES MELLITUS WITH HYPERGLYCEMIA, WITH LONG-TERM CURRENT USE OF INSULIN: ICD-10-CM

## 2025-08-11 RX ORDER — TIRZEPATIDE 2.5 MG/.5ML
2.5 INJECTION, SOLUTION SUBCUTANEOUS WEEKLY
Qty: 2 ML | Refills: 0 | OUTPATIENT
Start: 2025-08-11

## 2025-08-29 ENCOUNTER — LAB (OUTPATIENT)
Dept: LAB | Facility: HOSPITAL | Age: 60
End: 2025-08-29
Payer: COMMERCIAL

## 2025-08-29 DIAGNOSIS — E55.9 VITAMIN D DEFICIENCY: ICD-10-CM

## 2025-08-29 DIAGNOSIS — E03.9 HYPOTHYROIDISM, UNSPECIFIED TYPE: ICD-10-CM

## 2025-08-29 DIAGNOSIS — I10 HYPERTENSION, ESSENTIAL: ICD-10-CM

## 2025-08-29 DIAGNOSIS — E78.5 HYPERLIPIDEMIA, UNSPECIFIED HYPERLIPIDEMIA TYPE: ICD-10-CM

## 2025-08-29 LAB
25(OH)D3 SERPL-MCNC: 25.3 NG/ML (ref 30–100)
ALBUMIN SERPL-MCNC: 4.3 G/DL (ref 3.5–5.2)
ALBUMIN/GLOB SERPL: 1.4 G/DL
ALP SERPL-CCNC: 62 U/L (ref 39–117)
ALT SERPL W P-5'-P-CCNC: 28 U/L (ref 1–33)
ANION GAP SERPL CALCULATED.3IONS-SCNC: 18 MMOL/L (ref 5–15)
AST SERPL-CCNC: 24 U/L (ref 1–32)
BASOPHILS # BLD AUTO: 0.04 10*3/MM3 (ref 0–0.2)
BASOPHILS NFR BLD AUTO: 0.6 % (ref 0–1.5)
BILIRUB SERPL-MCNC: 0.6 MG/DL (ref 0–1.2)
BUN SERPL-MCNC: 16 MG/DL (ref 8–23)
BUN/CREAT SERPL: 19.5 (ref 7–25)
CALCIUM SPEC-SCNC: 9.9 MG/DL (ref 8.6–10.5)
CHLORIDE SERPL-SCNC: 100 MMOL/L (ref 98–107)
CHOLEST SERPL-MCNC: 180 MG/DL (ref 0–200)
CO2 SERPL-SCNC: 23 MMOL/L (ref 22–29)
CREAT SERPL-MCNC: 0.82 MG/DL (ref 0.57–1)
DEPRECATED RDW RBC AUTO: 48.9 FL (ref 37–54)
EGFRCR SERPLBLD CKD-EPI 2021: 82 ML/MIN/1.73
EOSINOPHIL # BLD AUTO: 0.24 10*3/MM3 (ref 0–0.4)
EOSINOPHIL NFR BLD AUTO: 3.5 % (ref 0.3–6.2)
ERYTHROCYTE [DISTWIDTH] IN BLOOD BY AUTOMATED COUNT: 14.9 % (ref 12.3–15.4)
GLOBULIN UR ELPH-MCNC: 3.1 GM/DL
GLUCOSE SERPL-MCNC: 130 MG/DL (ref 65–99)
HCT VFR BLD AUTO: 44.8 % (ref 34–46.6)
HDLC SERPL-MCNC: 22 MG/DL (ref 40–60)
HGB BLD-MCNC: 14.6 G/DL (ref 12–15.9)
IMM GRANULOCYTES # BLD AUTO: 0.03 10*3/MM3 (ref 0–0.05)
IMM GRANULOCYTES NFR BLD AUTO: 0.4 % (ref 0–0.5)
LDLC SERPL CALC-MCNC: 76 MG/DL (ref 0–100)
LDLC/HDLC SERPL: 2.46 {RATIO}
LYMPHOCYTES # BLD AUTO: 2.38 10*3/MM3 (ref 0.7–3.1)
LYMPHOCYTES NFR BLD AUTO: 34.5 % (ref 19.6–45.3)
MCH RBC QN AUTO: 28.7 PG (ref 26.6–33)
MCHC RBC AUTO-ENTMCNC: 32.6 G/DL (ref 31.5–35.7)
MCV RBC AUTO: 88.2 FL (ref 79–97)
MONOCYTES # BLD AUTO: 0.53 10*3/MM3 (ref 0.1–0.9)
MONOCYTES NFR BLD AUTO: 7.7 % (ref 5–12)
NEUTROPHILS NFR BLD AUTO: 3.68 10*3/MM3 (ref 1.7–7)
NEUTROPHILS NFR BLD AUTO: 53.3 % (ref 42.7–76)
PLATELET # BLD AUTO: 166 10*3/MM3 (ref 140–450)
PMV BLD AUTO: 11.3 FL (ref 6–12)
POTASSIUM SERPL-SCNC: 4.3 MMOL/L (ref 3.5–5.2)
PROT SERPL-MCNC: 7.4 G/DL (ref 6–8.5)
RBC # BLD AUTO: 5.08 10*6/MM3 (ref 3.77–5.28)
SODIUM SERPL-SCNC: 141 MMOL/L (ref 136–145)
TRIGL SERPL-MCNC: 519 MG/DL (ref 0–150)
TSH SERPL DL<=0.05 MIU/L-ACNC: 3.07 UIU/ML (ref 0.27–4.2)
VLDLC SERPL-MCNC: 82 MG/DL (ref 5–40)
WBC NRBC COR # BLD AUTO: 6.9 10*3/MM3 (ref 3.4–10.8)

## 2025-08-29 PROCEDURE — 36415 COLL VENOUS BLD VENIPUNCTURE: CPT

## 2025-08-29 PROCEDURE — 82306 VITAMIN D 25 HYDROXY: CPT

## 2025-08-29 PROCEDURE — 80061 LIPID PANEL: CPT

## 2025-08-29 PROCEDURE — 80050 GENERAL HEALTH PANEL: CPT
